# Patient Record
Sex: MALE | HISPANIC OR LATINO | Employment: FULL TIME | URBAN - METROPOLITAN AREA
[De-identification: names, ages, dates, MRNs, and addresses within clinical notes are randomized per-mention and may not be internally consistent; named-entity substitution may affect disease eponyms.]

---

## 2020-02-21 ENCOUNTER — OFFICE VISIT (OUTPATIENT)
Dept: RETAIL CLINIC | Facility: CLINIC | Age: 50
End: 2020-02-21

## 2020-02-21 VITALS
HEIGHT: 66 IN | SYSTOLIC BLOOD PRESSURE: 132 MMHG | BODY MASS INDEX: 25.55 KG/M2 | TEMPERATURE: 98.3 F | RESPIRATION RATE: 16 BRPM | WEIGHT: 159 LBS | OXYGEN SATURATION: 99 % | HEART RATE: 62 BPM | DIASTOLIC BLOOD PRESSURE: 90 MMHG

## 2020-02-21 DIAGNOSIS — J02.9 PHARYNGITIS, UNSPECIFIED ETIOLOGY: ICD-10-CM

## 2020-02-21 DIAGNOSIS — J40 BRONCHITIS: Primary | ICD-10-CM

## 2020-02-21 LAB
EXPIRATION DATE: NORMAL
INTERNAL CONTROL: NORMAL
Lab: NORMAL
S PYO AG THROAT QL: NEGATIVE

## 2020-02-21 PROCEDURE — 99213 OFFICE O/P EST LOW 20 MIN: CPT | Performed by: NURSE PRACTITIONER

## 2020-02-21 PROCEDURE — 87880 STREP A ASSAY W/OPTIC: CPT | Performed by: NURSE PRACTITIONER

## 2020-02-21 RX ORDER — AMOXICILLIN 500 MG/1
500 CAPSULE ORAL 3 TIMES DAILY
COMMUNITY
Start: 2020-01-29 | End: 2020-02-21

## 2020-02-21 RX ORDER — AZITHROMYCIN 250 MG/1
TABLET, FILM COATED ORAL
Qty: 6 TABLET | Refills: 0 | Status: SHIPPED | OUTPATIENT
Start: 2020-02-21

## 2020-02-21 RX ORDER — BROMPHENIRAMINE MALEATE, PSEUDOEPHEDRINE HYDROCHLORIDE, AND DEXTROMETHORPHAN HYDROBROMIDE 2; 30; 10 MG/5ML; MG/5ML; MG/5ML
10 SYRUP ORAL 4 TIMES DAILY PRN
Qty: 200 ML | Refills: 0 | Status: SHIPPED | OUTPATIENT
Start: 2020-02-21 | End: 2020-02-26

## 2020-02-21 NOTE — PATIENT INSTRUCTIONS
Faringitis  Pharyngitis    La faringitis ocurre cuando hay enrojecimiento, dolor e hinchazón (inflamación) en la garganta (faringe). Es irma causa muy común de dolor de garganta. La faringitis puede ser causada por irma bacteria, mikayla por lo general la provoca un virus. La mayoría de los casos de faringitis se curan sin tratamiento.  ¿Cuáles son las causas?  Esta afección puede ser causada por lo siguiente:  · Infección por virus (viral). La faringitis viral se contagia de irma persona a otra (es contagiosa) al toser, estornudar y compartir objetos o utensilios personales dax tazas, tenedores, cucharas, cepillos de diente.  · Infección por bacterias (bacteriana). La faringitis bacteriana se puede contagiar al tocarse la nariz o brittny luego de entrar en contacto con la bacteria, o a través de un contacto más íntimo, dax por ejemplo, al besarse.  · Alergias. Las alergias pueden causar irma acumulación de mucosidad en la garganta (goteo posnasal) que deriva en la inflamación e irritación. A mcpherson vez, las alergias pueden bloquear las fosas nasales, lo cual hace que se deba respirar por la boca, y esto seca e irrita la garganta.  ¿Qué incrementa el riesgo?  Es más probable que desarrolle esta afección si:  · Tiene entre 5 y 24 años.  · Está en lugares muy concurridos, tales dax guardería, escuela o vivir en irma residencia estudiantil.  · Vive en un ambiente de clima frío.  · Tiene debilitado el sistema que combate las enfermedades (inmunitario).  ¿Cuáles son los signos o síntomas?  Los síntomas de esta condición varían según la causa (viral, bacteriana o alergia) y pueden incluir los siguientes:  · Dolor de garganta.  · Fatiga.  · Fiebre no muy bobby.  · Dolor de john.  · Rosi musculares y en las articulaciones.  · Erupciones cutáneas.  · Ganglios hinchados en la garganta (ganglios linfáticos).  · Película parecida a las placas en la garganta o amígdalas. Por lo general, esto es un síntoma de faringitis  bacteriana.  · Vómitos.  · Nariz tapada (congestión nasal).  · Tos.  · Ojos rojos con picazón (conjuntivitis).  · Pérdida del apetito.  ¿Cómo se diagnostica?  Generalmente, esta afección se diagnostica en función de los antecedentes médicos y de un examen físico. El médico le hará preguntas sobre la enfermedad y rachel síntomas. Puede que se lavelle un cultivo de mcpherson garganta para buscar bacterias (prueba rápida para estreptococos estreptococos). También es posible que se realicen otros análisis de laboratorio, según la posible causa, aunque esto es poco común.  ¿Cómo se trata?  Generalmente, esta afección mejora en el término de 3 o 4 días sin medicamentos. La faringitis bacteriana puede tratarse con antibióticos.  Siga estas indicaciones en mcpherson casa:  · [place] los medicamentos de venta guzman y los recetados solamente dax se lo haya indicado el médico.  ? Si le recetaron un antibiótico, tómelo dax se lo haya indicado el médico. No deje de abebe los antibióticos aunque comience a sentirse mejor.  ? No le administre aspirina a los niños por el riesgo de que contraigan el síndrome de [place].  · Pearl gran cantidad de líquido para mantener la orina de sobia alicia o color amarillo pálido.  · Descanse lo suficiente.  · Lavelle gárgaras con irma mezcla de agua y sal 3 o 4 veces al día, o cuando sea necesario. Para preparar la mezcla de agua con sal, disuelva por completo de media a 1 cucharadita de sal en 1 taza de agua tibia.  · Si mcpherson médico lo aprueba, puede usar pastillas o aerosoles para calmar la garganta.  Comuníquese con un médico si:  · Tiene bultos grandes y dolorosos en el javier.  · Tiene irma erupción cutánea.  · Cuando tose elimina irma expectoración tracy, amarillo amarronado o con joselito.  Solicite ayuda de inmediato si:  · El javier se pone rígido.  · Comienza a babear o no puede tragar líquidos.  · No puede beber ni abebe medicamentos sin vomitar.  · Siente un dolor intenso que no se va, incluso luego de abebe los  medicamentos.  · Tiene dificultades para respirar, y no a causa de la congestión nasal.  · Experimenta un nuevo dolor e hinchazón de las articulaciones dax las rodillas, tobillos, muñecas o codos.  Resumen  · La faringitis ocurre cuando hay enrojecimiento, dolor e hinchazón (inflamación) en la garganta (faringe).  · Si portillo la faringitis puede ser causada por irma bacteria, la causa más común son los virus.  · La mayoría de los casos de faringitis se curan sin tratamiento.  · La faringitis bacteriana se trata con antibióticos.  Esta información no tiene dax fin reemplazar el consejo del médico. Asegúrese de hacerle al médico cualquier pregunta que tenga.  Document Released: 09/27/2006 Document Revised: 05/01/2018 Document Reviewed: 05/01/2018  [employer] Interactive Patient Education © 2020 [employer]  Bronquitis aguda en los adultos  Acute Bronchitis, Adult    La bronquitis aguda es la hinchazón repentina (aguda) de las vías aéreas (bronquios) en los pulmones. La bronquitis aguda hace que se llenen estas vías con mucosidad y provoca dificultad para respirar. También puede causar tos o sibilancias.  En los adultos, la bronquitis aguda generalmente desaparece en 2 semanas. La tos provocada por la bronquitis puede durar hasta 3 semanas. El hábito de fumar, las alergias y el asma pueden empeorar esta afección. Los episodios repetidos de bronquitis pueden causar más problemas pulmonares, dax la enfermedad pulmonar obstructiva crónica (EPOC).  ¿Cuáles son las causas?  Esta afección puede ser causada por gérmenes y por sustancias que irritan los pulmones, por ejemplo:  · Virus del resfrío y de la gripe. La causa de la bronquitis suele ser el mismo virus que produce el resfrío.  · Bacterias.  · Exposición al humo del tabaco, polvo, gases y contaminación del aire.  ¿Qué incrementa el riesgo?  Es más probable que esta afección se manifieste en las personas que:  · Tienen contacto cercano con alguien con bronquitis  aguda.  · Están expuestos a sustancias que irritan los pulmones, dax el humo del tabaco, polvo, gases y vapores.  · Tienen un sistema inmunitario débil.  · Tienen irma afección respiratoria, dax el asma.  ¿Cuáles son los signos o síntomas?  Los síntomas de esta afección incluyen los siguientes:  · Tos.  · Despedir irma mucosidad transparente, amarilla o tracy al toser.  · Sibilancias.  · Congestión en el pecho.  · Falta de aire.  · Fiebre.  · Rosi en el cuerpo.  · Escalofríos.  · Dolor de garganta.  ¿Cómo se diagnostica?  Por lo general, esta afección se diagnostica mediante un examen físico. Lexa el examen, el médico puede solicitar estudios, dax radiografías de tórax, para descartar otras afecciones. El médico también puede hacer lo siguiente:  · Analizar irma muestra de mucosidad para detectar irma infección bacteriana.  · Confirmar el nivel de oxígeno en la joselito. [place] se hace para determinar si hay neumonía.  · Realizar irma radiografía de tórax o pruebas de la función pulmonar para descartar irma neumonía u otras afecciones.  · Realizar análisis de joselito.  El médico también le preguntará sobre rachel síntomas y los antecedentes médicos.  ¿Cómo se trata?  La mayoría de los casos de bronquitis aguda se recupera con el tiempo, sin tratamiento. El médico puede recomendarle lo siguiente:  · Beber más líquidos. Beber más cantidad de líquidos ayuda a diluir la mucosidad para facilitar la respiración.  · [place] un medicamento para la fiebre o la tos.  · Lubna un antibiótico.  · Usar un inhalador para respirar mejor y controlar la tos.  · Usar un humidificador o vaporizador de archie fría para facilitar la respiración.  Siga estas indicaciones en mcpherson casa:  Medicamentos  · [place] los medicamentos de venta guzman y los recetados solamente dax se lo haya indicado el médico.  · Si le recetaron un antibiótico, tómelo dax se lo haya indicado el médico. No deje de lubna el antibiótico aunque comience a sentirse  mejor.  Instrucciones generales    · Descanse lo suficiente.  · Pearl suficiente líquido para mantener la orina de color amarillo pálido.  · Evite fumar o aspirar el humo de otros fumadores. La exposición al humo del cigarrillo o a irritantes químicos hará que la bronquitis empeore. Si fuma y necesita ayuda para dejar de fumar, consulte al médico. Si ray de fumar, rachel pulmones se curarán más rápido.  · Utilice un inhalador, un humidificador o un vaporizador de archie fría, dax se lo haya indicado el médico.  · Concurra a todas las visitas de control dax se lo haya indicado el médico. [place] es importante.  ¿Cómo se gen?  Para disminuir el riesgo de volver a sufrir esta afección:  · Lávese las marjorie frecuentemente con agua y jabón. Use desinfectante para marjorie si no dispone de agua y jabón.  · Evite el contacto con personas que tienen síntomas de resfrío.  · Trate de no llevar las marjorie a la boca, la nariz o los ojos.  · Recuerde aplicarse la vacuna contra la gripe todos los años.  Comuníquese con un médico si:  · Los síntomas no mejoran después de 2 semanas de tratamiento.  Solicite ayuda de inmediato si:  · Tose y escupe joselito.  · Siente dolor en el pecho.  · Le falta el aire de manera preocupante.  · Se deshidrata.  · Se desmaya o se siente dax si se fuera a desmayar.  · Sigue vomitando.  · Tiene un dolor de john intenso.  · La fiebre o los escalofríos empeoran.  Esta información no tiene dax fin reemplazar el consejo del médico. Asegúrese de hacerle al médico cualquier pregunta que tenga.  Document Released: 12/18/2006 Document Revised: 10/04/2018 Document Reviewed: 06/07/2017  [employer] Interactive Patient Education © 2020 [employer]

## 2020-02-21 NOTE — PROGRESS NOTES
Subjective   Chief Complaint   Patient presents with   • Sore Throat   • Cough       Angel Villalobos is a 50 y.o. male.     URI    This is a new problem. Episode onset: 3 weeks. The problem has been gradually worsening. There has been no fever. Associated symptoms include coughing (productive), sneezing, a sore throat and wheezing. Pertinent negatives include no abdominal pain, congestion, diarrhea, ear pain, headaches, nausea, plugged ear sensation, rhinorrhea, sinus pain or vomiting. He has tried nothing for the symptoms.        No Known Allergies    History reviewed. No pertinent past medical history.    Past Surgical History:   Procedure Laterality Date   • ANKLE SURGERY         Social History     Socioeconomic History   • Marital status:      Spouse name: Not on file   • Number of children: Not on file   • Years of education: Not on file   • Highest education level: Not on file   Tobacco Use   • Smoking status: Never Smoker   Substance and Sexual Activity   • Alcohol use: Defer   • Drug use: Defer   • Sexual activity: Defer       Family History   Problem Relation Age of Onset   • No Known Problems Mother    • No Known Problems Father          Current Outpatient Medications:   •  azithromycin (ZITHROMAX Z-ROBERT) 250 MG tablet, Take 2 tablets the first day, then 1 tablet daily for 4 days., Disp: 6 tablet, Rfl: 0  •  brompheniramine-pseudoephedrine-DM 30-2-10 MG/5ML syrup, Take 10 mL by mouth 4 (Four) Times a Day As Needed for Congestion or Cough for up to 5 days., Disp: 200 mL, Rfl: 0      Review of Systems   Constitutional: Negative for chills, diaphoresis, fatigue and fever.   HENT: Positive for sneezing and sore throat. Negative for congestion, ear pain and rhinorrhea.    Respiratory: Positive for cough (productive), shortness of breath and wheezing.    Gastrointestinal: Negative for abdominal pain, diarrhea, nausea and vomiting.   Musculoskeletal: Negative for myalgias.   Neurological: Negative for  "headache.        Vitals:    02/21/20 1010   BP: 132/90   Pulse: 62   Resp: 16   Temp: 98.3 °F (36.8 °C)   TempSrc: Oral   SpO2: 99%   Weight: 72.1 kg (159 lb)   Height: 167.6 cm (66\")       Objective   Physical Exam   Constitutional: He is oriented to person, place, and time. He appears well-developed and well-nourished.   HENT:   Head: Normocephalic.   Right Ear: Tympanic membrane, external ear and ear canal normal.   Left Ear: Tympanic membrane, external ear and ear canal normal.   Nose: Nose normal. Right sinus exhibits no maxillary sinus tenderness and no frontal sinus tenderness. Left sinus exhibits no maxillary sinus tenderness and no frontal sinus tenderness.   Mouth/Throat: Mucous membranes are normal. Posterior oropharyngeal erythema present. No tonsillar exudate.   Eyes: Conjunctivae are normal.   Cardiovascular: Normal rate, regular rhythm, S1 normal, S2 normal and normal heart sounds.   Pulmonary/Chest: Effort normal and breath sounds normal.   Abdominal: Soft. Normal appearance. There is no tenderness.   Lymphadenopathy:     He has no cervical adenopathy.   Neurological: He is alert and oriented to person, place, and time.        Procedures     Assessment/Plan   Angel was seen today for sore throat and cough.    Diagnoses and all orders for this visit:    Bronchitis  -     azithromycin (ZITHROMAX Z-ROBERT) 250 MG tablet; Take 2 tablets the first day, then 1 tablet daily for 4 days.  -     brompheniramine-pseudoephedrine-DM 30-2-10 MG/5ML syrup; Take 10 mL by mouth 4 (Four) Times a Day As Needed for Congestion or Cough for up to 5 days.    Pharyngitis, unspecified etiology  -     POC Rapid Strep A  -     azithromycin (ZITHROMAX Z-ROBERT) 250 MG tablet; Take 2 tablets the first day, then 1 tablet daily for 4 days.        Results for orders placed or performed in visit on 02/21/20   POC Rapid Strep A   Result Value Ref Range    Rapid Strep A Screen Negative Negative, VALID, INVALID, Not Performed    Internal " Control Passed Passed    Lot Number [id number]     Expiration Date 07/15/2022        PLAN: Discussed dosing, side effects, recommended other symptomatic care.  Patient should follow up with primary care provider if symptoms worsen, fail to resolve or other symptoms need attention. Patient/family agree to the above. Advised to alternate tylenol and motrin for pain and/or fever, stay hydrated and rest.     An After Visit Summary was printed and given to the patient.      JABARI Presley